# Patient Record
Sex: MALE | Race: WHITE | Employment: FULL TIME | URBAN - METROPOLITAN AREA
[De-identification: names, ages, dates, MRNs, and addresses within clinical notes are randomized per-mention and may not be internally consistent; named-entity substitution may affect disease eponyms.]

---

## 2017-02-17 ENCOUNTER — APPOINTMENT (OUTPATIENT)
Dept: GENERAL RADIOLOGY | Age: 47
DRG: 390 | End: 2017-02-17
Attending: EMERGENCY MEDICINE
Payer: COMMERCIAL

## 2017-02-17 ENCOUNTER — HOSPITAL ENCOUNTER (INPATIENT)
Facility: HOSPITAL | Age: 47
LOS: 1 days | Discharge: HOME | DRG: 390 | End: 2017-02-18
Attending: EMERGENCY MEDICINE | Admitting: HOSPITALIST
Payer: COMMERCIAL

## 2017-02-17 DIAGNOSIS — K56.609 SBO (SMALL BOWEL OBSTRUCTION) (HCC): Primary | ICD-10-CM

## 2017-02-17 LAB
ALBUMIN SERPL-MCNC: 3.9 G/DL (ref 3.5–4.8)
ALP LIVER SERPL-CCNC: 59 U/L (ref 45–117)
ALT SERPL-CCNC: 33 U/L (ref 17–63)
AST SERPL-CCNC: 24 U/L (ref 15–41)
BASOPHILS # BLD AUTO: 0.03 X10(3) UL (ref 0–0.1)
BASOPHILS NFR BLD AUTO: 0.2 %
BILIRUB SERPL-MCNC: 0.3 MG/DL (ref 0.1–2)
BUN BLD-MCNC: 17 MG/DL (ref 8–20)
CALCIUM BLD-MCNC: 8.6 MG/DL (ref 8.3–10.3)
CHLORIDE: 106 MMOL/L (ref 101–111)
CO2: 25 MMOL/L (ref 22–32)
CREAT BLD-MCNC: 0.92 MG/DL (ref 0.7–1.3)
EOSINOPHIL # BLD AUTO: 0.1 X10(3) UL (ref 0–0.3)
EOSINOPHIL NFR BLD AUTO: 0.8 %
ERYTHROCYTE [DISTWIDTH] IN BLOOD BY AUTOMATED COUNT: 12.8 % (ref 11.5–16)
GLUCOSE BLD-MCNC: 99 MG/DL (ref 70–99)
HCT VFR BLD AUTO: 50.6 % (ref 37–53)
HGB BLD-MCNC: 16.5 G/DL (ref 13–17)
IMMATURE GRANULOCYTE COUNT: 0.05 X10(3) UL (ref 0–1)
IMMATURE GRANULOCYTE RATIO %: 0.4 %
LIPASE: 120 U/L (ref 73–393)
LYMPHOCYTES # BLD AUTO: 1.7 X10(3) UL (ref 0.9–4)
LYMPHOCYTES NFR BLD AUTO: 13.3 %
M PROTEIN MFR SERPL ELPH: 7.5 G/DL (ref 6.1–8.3)
MCH RBC QN AUTO: 29.8 PG (ref 27–33.2)
MCHC RBC AUTO-ENTMCNC: 32.6 G/DL (ref 31–37)
MCV RBC AUTO: 91.5 FL (ref 80–99)
MONOCYTES # BLD AUTO: 0.91 X10(3) UL (ref 0.1–0.6)
MONOCYTES NFR BLD AUTO: 7.1 %
NEUTROPHIL ABS PRELIM: 10.02 X10 (3) UL (ref 1.3–6.7)
NEUTROPHILS # BLD AUTO: 10.02 X10(3) UL (ref 1.3–6.7)
NEUTROPHILS NFR BLD AUTO: 78.2 %
PLATELET # BLD AUTO: 351 10(3)UL (ref 150–450)
POTASSIUM SERPL-SCNC: 4.4 MMOL/L (ref 3.6–5.1)
RBC # BLD AUTO: 5.53 X10(6)UL (ref 4.3–5.7)
RED CELL DISTRIBUTION WIDTH-SD: 43.4 FL (ref 35.1–46.3)
SODIUM SERPL-SCNC: 138 MMOL/L (ref 136–144)
WBC # BLD AUTO: 12.8 X10(3) UL (ref 4–13)

## 2017-02-17 PROCEDURE — 74020 XR ABDOMEN, OBSTRUCTIVE SERIES (CPT=74020): CPT

## 2017-02-17 PROCEDURE — 99222 1ST HOSP IP/OBS MODERATE 55: CPT | Performed by: HOSPITALIST

## 2017-02-17 RX ORDER — ONDANSETRON 2 MG/ML
4 INJECTION INTRAMUSCULAR; INTRAVENOUS EVERY 4 HOURS PRN
Status: DISCONTINUED | OUTPATIENT
Start: 2017-02-17 | End: 2017-02-18

## 2017-02-17 RX ORDER — HYDROMORPHONE HYDROCHLORIDE 1 MG/ML
1 INJECTION, SOLUTION INTRAMUSCULAR; INTRAVENOUS; SUBCUTANEOUS EVERY 30 MIN PRN
Status: DISCONTINUED | OUTPATIENT
Start: 2017-02-17 | End: 2017-02-18

## 2017-02-17 RX ORDER — MORPHINE SULFATE 4 MG/ML
4 INJECTION, SOLUTION INTRAMUSCULAR; INTRAVENOUS EVERY 2 HOUR PRN
Status: DISCONTINUED | OUTPATIENT
Start: 2017-02-17 | End: 2017-02-18

## 2017-02-17 RX ORDER — SODIUM CHLORIDE 9 MG/ML
INJECTION, SOLUTION INTRAVENOUS CONTINUOUS
Status: DISCONTINUED | OUTPATIENT
Start: 2017-02-17 | End: 2017-02-17

## 2017-02-17 RX ORDER — MORPHINE SULFATE 2 MG/ML
1 INJECTION, SOLUTION INTRAMUSCULAR; INTRAVENOUS EVERY 2 HOUR PRN
Status: DISCONTINUED | OUTPATIENT
Start: 2017-02-17 | End: 2017-02-18

## 2017-02-17 RX ORDER — ONDANSETRON 2 MG/ML
4 INJECTION INTRAMUSCULAR; INTRAVENOUS ONCE
Status: COMPLETED | OUTPATIENT
Start: 2017-02-17 | End: 2017-02-17

## 2017-02-17 RX ORDER — HEPARIN SODIUM 5000 [USP'U]/ML
5000 INJECTION, SOLUTION INTRAVENOUS; SUBCUTANEOUS EVERY 8 HOURS
Status: DISCONTINUED | OUTPATIENT
Start: 2017-02-17 | End: 2017-02-18

## 2017-02-17 RX ORDER — ACETAMINOPHEN 325 MG/1
650 TABLET ORAL EVERY 6 HOURS PRN
Status: DISCONTINUED | OUTPATIENT
Start: 2017-02-17 | End: 2017-02-18

## 2017-02-17 RX ORDER — HYDROMORPHONE HYDROCHLORIDE 1 MG/ML
0.5 INJECTION, SOLUTION INTRAMUSCULAR; INTRAVENOUS; SUBCUTANEOUS EVERY 30 MIN PRN
Status: DISCONTINUED | OUTPATIENT
Start: 2017-02-17 | End: 2017-02-17

## 2017-02-17 RX ORDER — MORPHINE SULFATE 2 MG/ML
2 INJECTION, SOLUTION INTRAMUSCULAR; INTRAVENOUS EVERY 2 HOUR PRN
Status: DISCONTINUED | OUTPATIENT
Start: 2017-02-17 | End: 2017-02-18

## 2017-02-17 RX ORDER — SODIUM CHLORIDE 9 MG/ML
INJECTION, SOLUTION INTRAVENOUS CONTINUOUS
Status: ACTIVE | OUTPATIENT
Start: 2017-02-17 | End: 2017-02-17

## 2017-02-17 NOTE — CONSULTS
GASTROENTEROLOGY CONSULTATION  Jacklyn Sargent MD    Department of Gastroenterology  [address] [facility] Patient Status:  Inpatient    5/3/1970 MRN [id number]   [facility] 5NW-A Attending Juan Escamilla MD   Hosp Day # 0 PCP Arelis Blanco     Small colon polyp. PROCEDURE PERFORMED:     1.      Upper endoscopy with biopsy. 2.      Linear endoscopic ultrasound. 3.      Colonoscopy with polypectomy.     CONSENT:  The potential risks including but not limited to perforation, bleeding, infecti area in the neck of the pancreas that appeared to be slightly dilated, measuring approximately 2.5 mm in maximum diameter.  No obvious obstructing lesion was seen.  The head of the pancreas and the uncinate process were then examined from the second portio disease) 2/28/2014   • Tendonitis    • Abdominal pain      scheduled 03/28/16-colonoscopy,egd   • Dilated pancreatic duct      scheduled 03/28/16-egd,colonoscopy         Past Surgical History    INGUINAL HERNIA REPAIR  2010    Comment [facility]    OTHER  2 sputum. Genitourinary: Denies kidney stones, painful/difficult urination, frequent urinary infections, frequent urination, blood in urine, incontinence, kidney failure.   Psychosocial: noncontributory  Neuro: no tremor, dysarthria, gait disturbance  Skin: pelvis were obtained.      COMPARISON:  [place], CT ABDOMEN (W+WO) (CPT=74170), 10/28/2016, 15:40.  [place], CT ABDOMEN PELVIS IV CONTRAST, NO ORAL (ER), 3/22/2016, 2:18.      INDICATIONS:  abdominal  symptoms      PATIENT STATED HISTORY:  Patient boyer

## 2017-02-17 NOTE — PLAN OF CARE
NURSING ADMISSION NOTE      Patient admitted via Cart  Oriented to room. Safety precautions initiated. Bed in low position. Call light in reach.     Admission navigator done   Report given to [facility]

## 2017-02-17 NOTE — ED PROVIDER NOTES
Patient Seen in: [facility] In [place]    History   Patient presents with:  Nausea/Vomiting/Diarrhea (gastrointestinal)    Stated Complaint: ? bowel obstruction    HPI    61-year-old male that comes the hospital that she complained of Packs/Day: 0.00  Years:           Quit date: 11/27/2014    Alcohol Use: Yes           0.0 oz/week       0 Standard drinks or equivalent per week       Comment: mainly on weekends; social      Review of Systems    Positive for stated complaint: ? bowel Status                     ---------                               -----------         ------                     CBC W/ DIFFERENTIAL[[id number]]          Abnormal            Final result                 Please view results for these tests on the individual ondansetron HCl (ZOFRAN) injection 4 mg (4 mg Intravenous Given 2/17/17 0901)     I discussed case with the hospitalist as well as the patient's GI doc and the patient will be transferred to [facility] for admission for small bowel obstruction.   MDM

## 2017-02-17 NOTE — ED NOTES
PT WIFE GIVEN INSTRUCTIONS ABOUT TAKING PT TO [facility] FOR ADMISSION TO ROOM 531.   [address]

## 2017-02-17 NOTE — H&P
[facility]  History and Physical     Lataaaron Darden Patient Status:  Inpatient    5/3/1970 MRN [id number]   [facility] 5NW-A Attending Timothy Hamilton MD   Hosp Day # 0 PCP Nidia Perez MD     Chief Complaint: abd pain since 5 am drinks alcohol. He reports that he does not use illicit drugs. Family History: History reviewed. No pertinent family history. Allergies: No Known Allergies    Medications:    No current facility-administered medications on file prior to encounter.   C 7.5       No results for input(s): PTP, INR in the last 72 hours. No results for input(s): TROP, CK in the last 72 hours. Imaging: Imaging data reviewed in Epic. ASSESSMENT / PLAN:     1.  Small bowel obstruction continue supportive treatment wit

## 2017-02-18 VITALS
DIASTOLIC BLOOD PRESSURE: 54 MMHG | HEART RATE: 64 BPM | BODY MASS INDEX: 26.51 KG/M2 | RESPIRATION RATE: 16 BRPM | SYSTOLIC BLOOD PRESSURE: 109 MMHG | TEMPERATURE: 98 F | HEIGHT: 63 IN | WEIGHT: 149.63 LBS | OXYGEN SATURATION: 97 %

## 2017-02-18 LAB
BUN BLD-MCNC: 10 MG/DL (ref 8–20)
CALCIUM BLD-MCNC: 8.5 MG/DL (ref 8.3–10.3)
CHLORIDE: 103 MMOL/L (ref 101–111)
CO2: 28 MMOL/L (ref 22–32)
CREAT BLD-MCNC: 0.92 MG/DL (ref 0.7–1.3)
ERYTHROCYTE [DISTWIDTH] IN BLOOD BY AUTOMATED COUNT: 12.9 % (ref 11.5–16)
GLUCOSE BLD-MCNC: 85 MG/DL (ref 70–99)
HCT VFR BLD AUTO: 49.7 % (ref 37–53)
HGB BLD-MCNC: 16.4 G/DL (ref 13–17)
MCH RBC QN AUTO: 30.9 PG (ref 27–33.2)
MCHC RBC AUTO-ENTMCNC: 33 G/DL (ref 31–37)
MCV RBC AUTO: 93.8 FL (ref 80–99)
PLATELET # BLD AUTO: 320 10(3)UL (ref 150–450)
POTASSIUM SERPL-SCNC: 4.2 MMOL/L (ref 3.6–5.1)
RBC # BLD AUTO: 5.3 X10(6)UL (ref 4.3–5.7)
RED CELL DISTRIBUTION WIDTH-SD: 44 FL (ref 35.1–46.3)
SODIUM SERPL-SCNC: 138 MMOL/L (ref 136–144)
WBC # BLD AUTO: 9.6 X10(3) UL (ref 4–13)

## 2017-02-18 RX ORDER — ERYTHROMYCIN 5 MG/G
1 OINTMENT OPHTHALMIC 2 TIMES DAILY
Qty: 1 TUBE | Refills: 0 | Status: SHIPPED | OUTPATIENT
Start: 2017-02-18

## 2017-02-18 RX ORDER — ERYTHROMYCIN 5 MG/G
1 OINTMENT OPHTHALMIC
Status: DISCONTINUED | OUTPATIENT
Start: 2017-02-18 | End: 2017-02-18

## 2017-02-18 NOTE — DISCHARGE SUMMARY
[facility] [place] HOSPITAL[facility]  DISCHARGE SUMMARY     Roberth Cifuentes Patient Status:  Inpatient    5/3/1970 MRN [id number]   [facility] 5NW-A Attending Mathew Mejia MD   Hosp Day # 1 PCP Asia He MD     Date of Admission: 2017  Date of Disc Take 1-2 tablets by mouth every 4 (four) hours as needed for Pain. Quantity:  20 tablet   Refills:  0       Pramoxine HCl 1 % Foam   Commonly known as:  PROCTOFOAM        Place 1 applicator rectally every 2 (two) hours as needed for Hemorrhoids.     Kady Plata

## 2017-02-18 NOTE — PLAN OF CARE
Patient/Family Goals    • Patient/Family Long Term Goal Progressing    • Patient/Family Short Term Goal Progressing            PROBLEM: Illeus    ASSESS: Pt. AOx4, no c/o pain, pt. Eye feels much better, particle came out on day shift with saline flush.   P

## 2017-02-18 NOTE — PROGRESS NOTES
NURSING DISCHARGE NOTE    Discharged Home via Ambulatory. Accompanied by Family member  Belongings Taken by patient/family. DC HOME WITH WIFE, IV REMOVED. OK FOR DC PER HOSPITALIST, GI AND OPTHALMOLOGY.  [facility] EXCEPT ERYTHROMYCIN OINTMENT F

## 2017-02-18 NOTE — PROGRESS NOTES
NURSING ADMISSION NOTE      Patient admitted via Wheelchair  Oriented to room. Safety precautions initiated. Bed in low position. Call light in reach. VSS. Patient alert and oriented x4. No vomiting/nausea/diarrhea. Will continue to monitor.

## 2017-02-18 NOTE — CONSULTS
Patient examined at the bedside and notes improved right eye pain to zero over night. His vision is at baseline bilaterally.      Anterior exam: OD conjunctiva 1+ injection inferotemporal quadrant, cornea lens, lids WNL                         OS: conjuncti

## 2017-02-21 NOTE — PAYOR COMM NOTE
Attending Physician: No att. providers found    Review Type: ADMISSION   Reviewer: Lydia Ayon       Date: February 21, 2017 - 12:34 PM  Payor: Mellisa PAREDES  Authorization Number: [id number]/A  Admit date: 2/17/2017  8:20 AM   Admitted from Emergency Dept.:   Yes needed.  GI: Emergency room and consult pending  2. Dehydration continue IV fluid  3. History of pancreatitis  4. History of complicated bilateral inguinal hernia repairs with infected mesh.

## 2019-04-17 ENCOUNTER — PATIENT OUTREACH (OUTPATIENT)
Dept: FAMILY MEDICINE CLINIC | Facility: CLINIC | Age: 49
End: 2019-04-17

## 2019-04-17 ENCOUNTER — TELEPHONE (OUTPATIENT)
Dept: FAMILY MEDICINE CLINIC | Facility: CLINIC | Age: 49
End: 2019-04-17

## 2019-04-17 NOTE — TELEPHONE ENCOUNTER
Delmis Bravo is calling to let the office know that he has moved out of [place] and no longer see's Dr Saeid Maxwell

## 2019-04-17 NOTE — PROGRESS NOTES
Patient is on Dr. Mark Rodriguez list of patients that need follow up visit for either Well visit, HTN, Diabetes, etc,. ....     Patient was last seen on 07/25/2016   I need a confirmation from patient that he/she is seeing another PCP in order for me to fill out a

## 2024-09-27 ENCOUNTER — HOSPITAL ENCOUNTER (EMERGENCY)
Age: 54
Discharge: HOME | End: 2024-09-27
Payer: COMMERCIAL

## 2024-09-27 VITALS
DIASTOLIC BLOOD PRESSURE: 56 MMHG | HEIGHT: 64 IN | BODY MASS INDEX: 23.05 KG/M2 | SYSTOLIC BLOOD PRESSURE: 109 MMHG | RESPIRATION RATE: 20 BRPM | HEART RATE: 80 BPM | TEMPERATURE: 98 F | OXYGEN SATURATION: 97 % | WEIGHT: 135 LBS

## 2024-09-27 DIAGNOSIS — B34.9 HEADACHE DUE TO VIRAL INFECTION: ICD-10-CM

## 2024-09-27 DIAGNOSIS — R51.9 HEADACHE DUE TO VIRAL INFECTION: ICD-10-CM

## 2024-09-27 DIAGNOSIS — B34.9 VIRAL SYNDROME: Primary | ICD-10-CM

## 2024-09-27 LAB
ALBUMIN SERPL-MCNC: 4.2 G/DL (ref 3.4–5)
ALBUMIN/GLOB SERPL: 1.3 {RATIO} (ref 1–2)
ALP LIVER SERPL-CCNC: 43 U/L
ALT SERPL-CCNC: 33 U/L
ANION GAP SERPL CALC-SCNC: 7 MMOL/L (ref 0–18)
AST SERPL-CCNC: 24 U/L (ref 15–37)
BASOPHILS # BLD AUTO: 0.03 X10(3) UL (ref 0–0.2)
BASOPHILS NFR BLD AUTO: 0.5 %
BILIRUB SERPL-MCNC: 0.5 MG/DL (ref 0.1–2)
BUN BLD-MCNC: 16 MG/DL (ref 9–23)
CALCIUM BLD-MCNC: 9.4 MG/DL (ref 8.5–10.1)
CHLORIDE SERPL-SCNC: 101 MMOL/L (ref 98–112)
CO2 SERPL-SCNC: 28 MMOL/L (ref 21–32)
CREAT BLD-MCNC: 0.94 MG/DL
EGFRCR SERPLBLD CKD-EPI 2021: 98 ML/MIN/1.73M2 (ref 60–?)
EOSINOPHIL # BLD AUTO: 0.1 X10(3) UL (ref 0–0.7)
EOSINOPHIL NFR BLD AUTO: 1.6 %
ERYTHROCYTE [DISTWIDTH] IN BLOOD BY AUTOMATED COUNT: 11.9 %
GLOBULIN PLAS-MCNC: 3.3 G/DL (ref 2.8–4.4)
GLUCOSE BLD-MCNC: 98 MG/DL (ref 70–99)
HCT VFR BLD AUTO: 45 %
HGB BLD-MCNC: 16 G/DL
IMM GRANULOCYTES # BLD AUTO: 0.01 X10(3) UL (ref 0–1)
IMM GRANULOCYTES NFR BLD: 0.2 %
LYMPHOCYTES # BLD AUTO: 2.22 X10(3) UL (ref 1–4)
LYMPHOCYTES NFR BLD AUTO: 34.5 %
MCH RBC QN AUTO: 32.3 PG (ref 26–34)
MCHC RBC AUTO-ENTMCNC: 35.6 G/DL (ref 31–37)
MCV RBC AUTO: 90.7 FL
MONOCYTES # BLD AUTO: 0.46 X10(3) UL (ref 0.1–1)
MONOCYTES NFR BLD AUTO: 7.2 %
NEUTROPHILS # BLD AUTO: 3.61 X10 (3) UL (ref 1.5–7.7)
NEUTROPHILS # BLD AUTO: 3.61 X10(3) UL (ref 1.5–7.7)
NEUTROPHILS NFR BLD AUTO: 56 %
OSMOLALITY SERPL CALC.SUM OF ELEC: 283 MOSM/KG (ref 275–295)
PLATELET # BLD AUTO: 329 10(3)UL (ref 150–450)
POTASSIUM SERPL-SCNC: 3.4 MMOL/L (ref 3.5–5.1)
PROT SERPL-MCNC: 7.5 G/DL (ref 6.4–8.2)
RBC # BLD AUTO: 4.96 X10(6)UL
SARS-COV-2 RNA RESP QL NAA+PROBE: NOT DETECTED
SODIUM SERPL-SCNC: 136 MMOL/L (ref 136–145)
WBC # BLD AUTO: 6.4 X10(3) UL (ref 4–11)

## 2024-09-27 PROCEDURE — 85025 COMPLETE CBC W/AUTO DIFF WBC: CPT | Performed by: PHYSICIAN ASSISTANT

## 2024-09-27 PROCEDURE — 96375 TX/PRO/DX INJ NEW DRUG ADDON: CPT

## 2024-09-27 PROCEDURE — 80053 COMPREHEN METABOLIC PANEL: CPT | Performed by: PHYSICIAN ASSISTANT

## 2024-09-27 PROCEDURE — 99284 EMERGENCY DEPT VISIT MOD MDM: CPT

## 2024-09-27 PROCEDURE — 96374 THER/PROPH/DIAG INJ IV PUSH: CPT

## 2024-09-27 PROCEDURE — 96361 HYDRATE IV INFUSION ADD-ON: CPT

## 2024-09-27 RX ORDER — KETOROLAC TROMETHAMINE 30 MG/ML
30 INJECTION, SOLUTION INTRAMUSCULAR; INTRAVENOUS ONCE
Status: COMPLETED | OUTPATIENT
Start: 2024-09-27 | End: 2024-09-27

## 2024-09-27 RX ORDER — DIPHENHYDRAMINE HYDROCHLORIDE 50 MG/ML
25 INJECTION INTRAMUSCULAR; INTRAVENOUS ONCE
Status: COMPLETED | OUTPATIENT
Start: 2024-09-27 | End: 2024-09-27

## 2024-09-27 RX ORDER — METOCLOPRAMIDE HYDROCHLORIDE 5 MG/ML
10 INJECTION INTRAMUSCULAR; INTRAVENOUS ONCE
Status: COMPLETED | OUTPATIENT
Start: 2024-09-27 | End: 2024-09-27

## 2024-09-28 NOTE — ED PROVIDER NOTES
Patient Seen in: [facility] In [place]      History     Chief Complaint   Patient presents with    Headache     Pt reports headache, stiff neck and itching near his eyes x 1 week, believes it may be mold as he is staying with a family member.     Stated Complaint: Headache    Subjective:   HPI    54-year-old gentleman.  Medical history of SBO, arrhythmia, hyperlipidemia, bowel resection.  Patient arrives to the ER for evaluation of headache, malaise, stiff neck, eye watering and congestion.  Symptomatic for the past week.  Has taken occasional Tylenol with minimal relief.  Possible tactile fevers.  Denies cough.  Denies sore throat.  Denies vomiting or diarrhea.    Objective:   Past Medical History:    Abdominal pain    scheduled 16-colonoscopy,egd    Dilated pancreatic duct (HCC)    scheduled 16-egd,colonoscopy    GERD (gastroesophageal reflux disease)    Heart murmur    High cholesterol    pt denies    History of small bowel obstruction    Irregular heart rate    Normal    Tendonitis              Past Surgical History:   Procedure Laterality Date    Bowel resection  2018    Colonoscopy N/A 2016    Procedure: COLONOSCOPY;  Surgeon: Vern Tatum MD;  Location:  ENDOSCOPY    Inguinal hernia repair  2010    [facility]    Other  2003    left wrist repair    Other surgical history N/A 2016    Procedure: ENDOSCOPIC ULTRASOUND (EUS);  Surgeon: Vern Tatum MD;  Location:  ENDOSCOPY    Other surgical history N/A 2016    Procedure: ESOPHAGOGASTRODUODENOSCOPY (EGD);  Surgeon: Vern Tatum MD;  Location:  ENDOSCOPY                Social History     Socioeconomic History    Marital status:    Tobacco Use    Smoking status: Former     Current packs/day: 0.00     Types: Cigarettes     Quit date: 2014     Years since quittin.8    Smokeless tobacco: Never   Vaping Use    Vaping status: Never Used   Substance and Sexual Activity     Alcohol use: Yes     Comment: occasionally    Drug use: No   Other Topics Concern    Caffeine Concern Yes     Comment: 1 cup of coffee daily    Exercise No    Seat Belt Yes     Social Determinants of Health     Financial Resource Strain: Low Risk  (2/6/2020)    Received from [facility]    Overall Financial Resource Strain (CARDIA)     Difficulty of Paying Living Expenses: Not hard at all   Food Insecurity: No Food Insecurity (2/6/2020)    Received from [facility]    Hunger Vital Sign     Worried About Running Out of Food in the Last Year: Never true     Ran Out of Food in the Last Year: Never true   Transportation Needs: No Transportation Needs (2/6/2020)    Received from [facility]    PRAPARE - Transportation     Lack of Transportation (Medical): No     Lack of Transportation (Non-Medical): No              Review of Systems    Positive for stated Chief Complaint: Headache (Pt reports headache, stiff neck and itching near his eyes x 1 week, believes it may be mold as he is staying with a family member.)    Other systems are as noted in HPI.  Constitutional and vital signs reviewed.      All other systems reviewed and negative except as noted above.    Physical Exam     ED Triage Vitals [09/27/24 1918]   /76   Pulse 93   Resp 16   Temp 98.1 °F (36.7 °C)   Temp src Oral   SpO2 96 %   O2 Device None (Room air)       Current Vitals:   Vital Signs  BP: 126/76  Pulse: 93  Resp: 16  Temp: 98.1 °F (36.7 °C)  Temp src: Oral    Oxygen Therapy  SpO2: 96 %  O2 Device: None (Room air)            Physical Exam    Gen: Well appearing, well groomed, alert and aware x 3  Neck: Supple, full range of motion, no thyromegaly or lymphadenopathy.  Eye examination: EOMs are intact, normal conjunctival  ENT: No injection noted to the bilateral auditory canals; no loss of landmarks. Normal nasal mucosa without audible nasal congestion.  Oropharynx is patent without evidence of  erythema, exudates or deviation.  No stridor to auscultation  Lung: No distress, RR, no retraction, breath sounds are clear bilaterally  Cardio: Regular rate and rhythm, normal S1-S2, no murmur appreciable  Skin: No sign of trauma, Skin warm and dry, no induration or sign of infection.  No rash noted      ED Course     Labs Reviewed   COMP METABOLIC PANEL (14) - Abnormal; Notable for the following components:       Result Value    Potassium 3.4 (*)     Alkaline Phosphatase 43 (*)     All other components within normal limits   RAPID SARS-COV-2 BY PCR - Normal   CBC WITH DIFFERENTIAL WITH PLATELET                      MDM          This is a nontoxic-appearing male.  Temperature 98.1.  Pulse 93.  Supple full range of motion of the neck with minimal discomfort.  No lymphadenopathy.  ENT evaluation benign.    We discussed possibility of resolving COVID.  His symptoms are slowly improving.    COVID swab obtained.  Basic labs.  Will attempt a migraine cocktail.      COVID-negative.    CBC demonstrates no leukocytosis.  Stable hemoglobin    CMP benign    Patient reevaluated.  Headache has resolved with migraine cocktail.  Body aches also significantly improved.  He feels well.    Reglan did make the patient feel slightly anxious.  We will note this in his allergies           Patient given clear instructions to follow-up close with his primary care physician.  Plenty of rest.  Alternate Tylenol Motrin.  Vitamin C and zinc.  For any acute headache, increased neck pain, high fever or other worsening immediately return to the ER.              Medical Decision Making      Disposition and Plan     Clinical Impression:  1. Viral syndrome    2. Headache due to viral infection         Disposition:  There is no disposition on file for this visit.  There is no disposition time on file for this visit.    Follow-up:  No follow-up provider specified.        Medications Prescribed:  There are no discharge medications for this  patient.

## 2024-09-28 NOTE — ED INITIAL ASSESSMENT (HPI)
Pt reports persistent headaches, stiff necks, watery eyes, nasal congestion x 1 week. Reports these symptoms have been present since moving into his sisters house to a basement bedroom.

## (undated) NOTE — IP AVS SNAPSHOT
[facility] [place] [address] [facility], [address] ~ [phone]                Discharge Summary   2/17/2017    Drusilla Simmonds           Admission Information        Provider Department    2/17/2017 Krsytyna Ornelas MD  5nw-A         [facility] [phone]  [address] [place] [id number]     Phone:  [phone]    - erythromycin 5 MG/GM Oint            Follow-up Information     Follow up with Zay Campbell MD On 2/20/2017.     Specialty:  OPHTHALMOLOGY    Contact information: 138 (02/18/17)  4.2 (02/18/17)  103      Radiology Exams     None         Additional Information       We are concerned for your overall well being:    - If you are a smoker or have smoked in the last 12 months, we encourage you to explore options for quit prescribed to take and their potential SIDE EFFECTS. Your nurse will review your medications with you before you are discharged, and can provide you with additional printed information.  Not all patients will experience these side effects or respond to [facility]